# Patient Record
Sex: MALE | Race: AMERICAN INDIAN OR ALASKA NATIVE
[De-identification: names, ages, dates, MRNs, and addresses within clinical notes are randomized per-mention and may not be internally consistent; named-entity substitution may affect disease eponyms.]

---

## 2023-04-11 ENCOUNTER — HOSPITAL ENCOUNTER (INPATIENT)
Dept: HOSPITAL 26 - MED | Age: 58
LOS: 9 days | Discharge: HOME | DRG: 720 | End: 2023-04-20
Attending: INTERNAL MEDICINE | Admitting: INTERNAL MEDICINE
Payer: MEDICAID

## 2023-04-11 VITALS — BODY MASS INDEX: 18.96 KG/M2 | HEIGHT: 66 IN | WEIGHT: 118 LBS

## 2023-04-11 VITALS — DIASTOLIC BLOOD PRESSURE: 64 MMHG | SYSTOLIC BLOOD PRESSURE: 126 MMHG

## 2023-04-11 VITALS — DIASTOLIC BLOOD PRESSURE: 75 MMHG | SYSTOLIC BLOOD PRESSURE: 135 MMHG

## 2023-04-11 DIAGNOSIS — E11.22: ICD-10-CM

## 2023-04-11 DIAGNOSIS — B95.62: ICD-10-CM

## 2023-04-11 DIAGNOSIS — E44.0: ICD-10-CM

## 2023-04-11 DIAGNOSIS — Z20.822: ICD-10-CM

## 2023-04-11 DIAGNOSIS — E78.5: ICD-10-CM

## 2023-04-11 DIAGNOSIS — A41.9: Primary | ICD-10-CM

## 2023-04-11 DIAGNOSIS — M94.0: ICD-10-CM

## 2023-04-11 DIAGNOSIS — N17.9: ICD-10-CM

## 2023-04-11 DIAGNOSIS — E88.09: ICD-10-CM

## 2023-04-11 DIAGNOSIS — E11.65: ICD-10-CM

## 2023-04-11 DIAGNOSIS — I12.9: ICD-10-CM

## 2023-04-11 DIAGNOSIS — N39.0: ICD-10-CM

## 2023-04-11 DIAGNOSIS — E87.1: ICD-10-CM

## 2023-04-11 DIAGNOSIS — N18.9: ICD-10-CM

## 2023-04-11 LAB
ALBUMIN FLD-MCNC: 2.4 G/DL (ref 3.4–5)
ANION GAP SERPL CALCULATED.3IONS-SCNC: 13.3 MMOL/L (ref 8–16)
APPEARANCE UR: CLEAR
AST SERPL-CCNC: 41 U/L (ref 15–37)
BILIRUB SERPL-MCNC: 1.3 MG/DL (ref 0–1)
BILIRUB UR QL STRIP: NEGATIVE
BUN SERPL-MCNC: 26 MG/DL (ref 7–18)
CHLORIDE SERPL-SCNC: 93 MMOL/L (ref 98–107)
CO2 SERPL-SCNC: 28.2 MMOL/L (ref 21–32)
COLOR UR: YELLOW
CREAT SERPL-MCNC: 1.2 MG/DL (ref 0.6–1.3)
ERYTHROCYTE [DISTWIDTH] IN BLOOD BY AUTOMATED COUNT: 13.5 % (ref 11.6–13.7)
GFR SERPL CREATININE-BSD FRML MDRD: 80 ML/MIN (ref 90–?)
GLUCOSE SERPL-MCNC: 368 MG/DL (ref 74–106)
GLUCOSE UR STRIP-MCNC: (no result) MG/DL
HCT VFR BLD AUTO: 38.1 % (ref 36–52)
HGB BLD-MCNC: 12.9 G/DL (ref 12–18)
HGB UR QL STRIP: (no result)
LEUKOCYTE ESTERASE UR QL STRIP: NEGATIVE
LYMPHOCYTES NFR BLD MANUAL: 11 % (ref 20–46)
MCH RBC QN AUTO: 29 PG (ref 27–31)
MCHC RBC AUTO-ENTMCNC: 34 G/DL (ref 33–37)
MCV RBC AUTO: 85.2 FL (ref 80–94)
MONOCYTES NFR BLD MANUAL: 8 % (ref 5–12)
NITRITE UR QL STRIP: POSITIVE
PH UR STRIP: 6 [PH] (ref 5–9)
PLATELET # BLD AUTO: 183 K/UL (ref 140–450)
POTASSIUM SERPL-SCNC: 4.5 MMOL/L (ref 3.5–5.1)
PROMYELOCYTES NFR BLD MANUAL: 1 % (ref 0–0)
PROTHROMBIN TIME: 10.4 SECS (ref 10.8–13.4)
RBC # BLD AUTO: 4.47 MIL/UL (ref 4.2–6.1)
RBC #/AREA URNS HPF: (no result) /HPF (ref 0–5)
SODIUM SERPL-SCNC: 130 MMOL/L (ref 136–145)
WBC # BLD AUTO: 18.2 K/UL (ref 4.8–10.8)
WBC,URINE: (no result) /HPF (ref 0–5)

## 2023-04-11 RX ADMIN — ACETAMINOPHEN PRN MG: 325 TABLET ORAL at 22:34

## 2023-04-11 RX ADMIN — SODIUM CHLORIDE SCH MLS/HR: 9 INJECTION, SOLUTION INTRAVENOUS at 18:30

## 2023-04-11 NOTE — NUR
56 y/o M BIBA from home c/o left-sided chest pain, generalized body pain, 
dizziness, blurry vision, SOB, nausea, vomiting, abd pain x 3 days. Patient 
A&Ox4, ambulatory, reports with multiple medical complaints. Pt states 
left-sided chest pain 8/10, pressure/intermittent, radiating down left shoulder 
and left hip. Patient also reports pain across low abd; 7/10, 
cramping/constant, radiating to left rib cage and left shoulder. Patient with 
associated nausea and vomiting x 2 episodes today. Pt with constipation last 
BM: 5 days ago with bloody stools; reports new onset hemorrhoids with dark red 
blood. +Hematuria +Decreased appetite x 3 days; +urinary hesitancy x 1 month. 
Denies dysuria, fever, chills, headache, trauma, injuries, falls. Patient 
denies any sick household members. Cardiac monitor in place. SpO2 97% on room 
air tachypneic @ 25. Bed locked in lowest position, side rails x 1.



PMH: HTN, DM, HLD

Meds: lipitor, metformin, carvedilol

Allergies: insulin

## 2023-04-11 NOTE — NUR
PER PT'S REQUEST CHECK THE BS  .   - WILL RELAY TO DR. LOZOYA 

-------------------------------------------------------------------------------

Addendum: 04/12/23 at 0350 by Tosha Connolly RN

-------------------------------------------------------------------------------

AT 0000 DR. LOZOYA AWARE ABOUT BS HE SAID OK  VIA TEXT

## 2023-04-11 NOTE — NUR
Patient resting with both eyes closed in semi-fowlers position with cardiac 
monitor in place. Bed locked in lowest position, side rails x 1.

## 2023-04-11 NOTE — NUR
Patient resting in semi-fowlers position with maintenance IVF continued at 
80mL/hr on infusion pump. Cardiac monitor in place. Bed locked in lowest 
position, side rails x 2. 

Shalini (daughter) remains at bedside.

## 2023-04-11 NOTE — NUR
Patient reports + relief to pain; 7/10 at this time. Denies nausea. All pt 
needs met. Cardiac monitor in place.

## 2023-04-11 NOTE — NUR
Patient will be admitted to care of Dr. Grace. Admited to tele.  Will go to 
[id number]. Belongings list completed.  Report to Tosha BRADFORD.

## 2023-04-11 NOTE — NUR
PTS SISTER REQUESTING ACCUCHECK D/T PT BEING DIABETIC. DR LOZOYA PAGED TO NOTIFY 
HIM OF PTS ALLERGY TO INSULIN. PT AND SISTER UNAWARE OF WHICH INSULIN HE IS 
ALLERGIC TO. PT REPORTS THIS OCCURRED X1 MONTH AGO AT [facility] AND THEY 
"HAD TO REVIVE ME WITH GAS MASK". CURRENT . DR LOZOYA NOTIFIED. TORB ORDER 
FOR GLIMEPIRIDE 2MG TID, NO NEED FOR ACCUCHECKS- WILL CHECK GLUCOSE THROUGH 
MORNING LABS. ORDERS PLACED.

## 2023-04-11 NOTE — NUR
RECEIV[place] AAOX[place] , [place]  NOT IN [place]RESS  , WALKS TO BED  , IV SITES INTACT  
AND PATENT .  ON TELE MONITOR , RA O2 SAT WNL . FURTHER ADMISSION ASSESSMENT  TO BE DONE .  
C/O WEAKNESS  - WILL PUT ON FALL PREVENTION PROTOCOL , URINAL / CALL LIGHT WITHIN REACH . 
WILL CONT. TO MONITOR .

## 2023-04-12 VITALS — SYSTOLIC BLOOD PRESSURE: 128 MMHG | DIASTOLIC BLOOD PRESSURE: 77 MMHG

## 2023-04-12 VITALS — SYSTOLIC BLOOD PRESSURE: 127 MMHG | DIASTOLIC BLOOD PRESSURE: 78 MMHG

## 2023-04-12 VITALS — SYSTOLIC BLOOD PRESSURE: 125 MMHG | DIASTOLIC BLOOD PRESSURE: 77 MMHG

## 2023-04-12 VITALS — DIASTOLIC BLOOD PRESSURE: 70 MMHG | SYSTOLIC BLOOD PRESSURE: 120 MMHG

## 2023-04-12 VITALS — DIASTOLIC BLOOD PRESSURE: 59 MMHG | SYSTOLIC BLOOD PRESSURE: 122 MMHG

## 2023-04-12 LAB
ALBUMIN FLD-MCNC: 1.9 G/DL (ref 3.4–5)
ANION GAP SERPL CALCULATED.3IONS-SCNC: 12.7 MMOL/L (ref 8–16)
AST SERPL-CCNC: 38 U/L (ref 15–37)
BILIRUB SERPL-MCNC: 0.9 MG/DL (ref 0–1)
BUN SERPL-MCNC: 27 MG/DL (ref 7–18)
CHLORIDE SERPL-SCNC: 98 MMOL/L (ref 98–107)
CO2 SERPL-SCNC: 24.9 MMOL/L (ref 21–32)
CREAT SERPL-MCNC: 1.3 MG/DL (ref 0.6–1.3)
GFR SERPL CREATININE-BSD FRML MDRD: 73 ML/MIN (ref 90–?)
GLUCOSE SERPL-MCNC: 444 MG/DL (ref 74–106)
POTASSIUM SERPL-SCNC: 4.6 MMOL/L (ref 3.5–5.1)
SODIUM SERPL-SCNC: 131 MMOL/L (ref 136–145)

## 2023-04-12 RX ADMIN — Medication SCH DEV: at 21:19

## 2023-04-12 RX ADMIN — ACETAMINOPHEN PRN MG: 325 TABLET ORAL at 18:03

## 2023-04-12 RX ADMIN — DOCUSATE SODIUM SCH MG: 100 CAPSULE, LIQUID FILLED ORAL at 08:43

## 2023-04-12 RX ADMIN — GLIMEPIRIDE SCH MG: 2 TABLET ORAL at 12:24

## 2023-04-12 RX ADMIN — GLIMEPIRIDE SCH MG: 2 TABLET ORAL at 17:33

## 2023-04-12 RX ADMIN — Medication SCH DEV: at 12:24

## 2023-04-12 RX ADMIN — HYDROCODONE BITARTRATE AND ACETAMINOPHEN PRN TAB: 5; 325 TABLET ORAL at 18:33

## 2023-04-12 RX ADMIN — SODIUM CHLORIDE SCH MLS/HR: 9 INJECTION, SOLUTION INTRAVENOUS at 02:34

## 2023-04-12 RX ADMIN — Medication SCH DEV: at 16:30

## 2023-04-12 RX ADMIN — GLIMEPIRIDE SCH MG: 2 TABLET ORAL at 08:43

## 2023-04-12 RX ADMIN — SODIUM CHLORIDE SCH MLS/HR: 9 INJECTION, SOLUTION INTRAVENOUS at 07:49

## 2023-04-12 NOTE — NUR
RECEIVED REPORT FROM NIGHT SHIFT. PT AWAKE IN BED, HOB ELEVATED. AOX4, ABLE TO VERBALIZE 
NEEDS. WITH C/O MUSCLE PAIN ON ALL EXTREMITIES AND CHEST WHEN MOVING, SOB ONLY ON EXERTION. 
WITH PERIPHERAL IV RAC 20G AND LAC 20G RUNNING NS AT 80CC/HR. PLAN OF CARE DISCUSSED.

## 2023-04-12 NOTE — NUR
DC PLANNING 



*** ASSESSMENT COMPLETE***



PLEASE REFER TO ASSESSMENT FOR ADDITIONAL DETAILS 



PT REPORTS TENTATIVE DC PLAN TO RETURN HOME HOWEVER, PT INQUIRED ON SNF 

PLACEMENT. SW SPOKE TO PATIENT ON HOW PLACEMENT IS TYPICALLY ARRANGED AND 

THE COMPLEXITIES OF SNF CARE. PT VERBALIZED UNDERSTANDING AND REPORTED HE 

WOULD SPEAK TO ATTENDING ON INQUIRING ABOUT SNF PLACEMENT. 



PT ACCEPTED [facility] OFFERED BY [facility]. 

SPOKE TO PT ABOUT WALK UP SHELTERS IN THE [place] AREA, HOWEVER, PT 

DECLINED AND REPORTED HE WAS NOT INTERESTED. PT REPORTS HE WOULD SPEAK TO 

HIS MOTHER ABOUT STAYING WITH HER UNTIL HE FELT WELL ENOUGH IF SNF PLACEMENT COULD NOT BE 
ARRANGED OR IDENTIFIED. 


-------------------------------------------------------------------------------

Addendum: 04/13/23 at 0822 by Bee MOTA

-------------------------------------------------------------------------------

Amended: Links added.

## 2023-04-12 NOTE — NUR
endorsed pt for cont. of care , i endorsed to nurse dilia  , rn  i relayed to dr. israel about 
latest bs of 444  he have to ff up  if there is further order from  dr. israel  , dilia , rn 
verbalized understanding . pt is  sleeping but easily awakable by touch  , call light / 
urinal within reach .

## 2023-04-12 NOTE — NUR
PT WITH EPISODE OF SEVERE CHEST PAIN WITH SOB  THAT RADIATED FROM LEFT SIDE OF ABD. PAIN 
STARTED TO SUBSIDE AFTER A FEW MINUTES. NORCO GIVEN FOR PAIN

## 2023-04-12 NOTE — NUR
DC PLANNIN YRS OLD MALE PATIENT WAS ADMITTED FROM HOME WITH A DX OF UTI, CHEST PAIN. PATIENT HAS A 
HX OF DM, HTN AND HLD . CXR SHOWED MILD STREAKY LEFT BASAL ATELECTASIS .CT ABD SHOWED 
HEPATIC CIRRHOSIS AND SEQUELA OF PORTAL HYPERTENSION. CT CHEST NO EVIDENCED OF PE. RAPID 
COVID TEST NEGATIVE. CONSULTED WITH NEPHRO. DC PLAN TO GO HOME WHEN STABLE CM TO FOLLOW 

-------------------------------------------------------------------------------

Addendum: 23 at 1213 by Louann Rajan RN

-------------------------------------------------------------------------------

DC PLANNING:

SEEN BY NEPHROLOGIST CONTINUED IV ABX VANCOMYCIN DC PLAN TO GO TO SNF FOR IV ABX  VANCOMYCIN 
FOR SIX WEEKS. PATIENT IS HOMELESS . TO FOLLOW UP FOR HOMELESSNESS. CM TO 
FOLLOW 

-------------------------------------------------------------------------------

Addendum: 23 at 1454 by Louann Rajan RN

-------------------------------------------------------------------------------

DC PLANNING:

CM SPOKE WITH PATIENT NOTIFIED HIM THAT UNABLE TO GET SNF AND PATIENT STATED WILL ASK HIS 
MOTHER IF SHE ALLOWS HIM TO STAY WITH HER UNTIL HE FINISHED THE IV ABX. PATIENT VERBALIZED 
UNDERSTANDING WILL DO MORE TO TAKE CARE OF HIMSELF, WILL START LOOKING FOR ALCOHOL AND DRUG 
REHAB PROGRAM. PATIENT STATED HE RECEIVED ALL THE RESOURCES AND WILL WORKING ON IT. DC PLAN 
TO GO HOME WITH HOME HEALTH FOR IV. CM TO FOLLOW 

-------------------------------------------------------------------------------

Addendum: 23 at 1351 by Louann Rajan RN

-------------------------------------------------------------------------------

DC PLANNING:

CM SPOKE WITH DR LONG REGARDING DC PLAN PE MD AWAITING FOR BC RESULT AND ANTIBIOTICS 
FROM ID. DC PLAN TO GO TO PT'S MOM'S HOUSE WITH HOME HEALTH FOR IV ABX. CM TO FOLLOW 

-------------------------------------------------------------------------------

Addendum: 23 at 2350 by Louann Rajan RN

-------------------------------------------------------------------------------

DC PLANNING:

ARRANGED HOME INFUSION VANCOMYCIN 19G Q 18 HRS WITH [facility]. FAXED THE ORDER LABS FOR 
VANCO TROUGH, CBC AND BMP EVERY WEEK. PER [facility] WILL ARRANGE  HOME HEALTH  FOR PICC LINE 
CARE AS WELL. MEDICATION WILL BE DELIVERED TODAY AND PATIENT CAN BE DISCHARGE NOTIFIED 
LUIZA CHARGE NURSE. CM TO FOLLOW

## 2023-04-12 NOTE — NUR
TEMP RE CHECK 100.1 F - WILL ORAL  PROVIDE FLUID ,  TSB REFUSED BY PT .IVF INFUSING WELL . 
WILL CONT. TO MONITOR  , CALL LIGHT WITHIN REACH .

## 2023-04-12 NOTE — NUR
PATIENT HAS BEEN SCREENED AND CATEGORIZED AS HIGH NUTRITION RISK. PATIENT WILL BE SEEN 
WITHIN 1-2 DAYS OF ADMISSION.



4/12/23-4/13/23



REVIEWED BY OLI BLUE RD

## 2023-04-12 NOTE — NUR
4/12/23 RD INITIAL ASSESSMENT COMPLETED



PLEASE REFER TO NUTRITION ASSESSMENT UNDER CARE ACTIVITY FOR ESTIMATED NUTRITIONAL NEEDS. 



1. RECOMMEND ADDING [id number] TO CARDIAC DIET AND DOWNGRADING TO MECHANICAL SOFT TEXTURE 

2. RECOMMEND ADDING GLUCERNA BID

-PROVIDING 440KCAL, 20G PROTEIN

3. PROVIDED NUTRITION EDUCATION AND HANDOUTS FOR DM

4. MONITOR GI, PO INTAKE, AND LAB VALUES.

5. RD TO FOLLOW-UP 3-5 DAYS, MODERATE RISK 



REVIEWED BY OLI BLUE RD

## 2023-04-13 VITALS — SYSTOLIC BLOOD PRESSURE: 115 MMHG | DIASTOLIC BLOOD PRESSURE: 65 MMHG

## 2023-04-13 VITALS — DIASTOLIC BLOOD PRESSURE: 80 MMHG | SYSTOLIC BLOOD PRESSURE: 120 MMHG

## 2023-04-13 VITALS — SYSTOLIC BLOOD PRESSURE: 126 MMHG | DIASTOLIC BLOOD PRESSURE: 78 MMHG

## 2023-04-13 VITALS — SYSTOLIC BLOOD PRESSURE: 117 MMHG | DIASTOLIC BLOOD PRESSURE: 65 MMHG

## 2023-04-13 VITALS — SYSTOLIC BLOOD PRESSURE: 112 MMHG | DIASTOLIC BLOOD PRESSURE: 78 MMHG

## 2023-04-13 VITALS — SYSTOLIC BLOOD PRESSURE: 122 MMHG | DIASTOLIC BLOOD PRESSURE: 80 MMHG

## 2023-04-13 LAB
ALBUMIN FLD-MCNC: 1.6 G/DL (ref 3.4–5)
ANION GAP SERPL CALCULATED.3IONS-SCNC: 10.6 MMOL/L (ref 8–16)
AST SERPL-CCNC: 50 U/L (ref 15–37)
BILIRUB SERPL-MCNC: 0.7 MG/DL (ref 0–1)
BUN SERPL-MCNC: 27 MG/DL (ref 7–18)
CHLORIDE SERPL-SCNC: 100 MMOL/L (ref 98–107)
CO2 SERPL-SCNC: 25.7 MMOL/L (ref 21–32)
CREAT SERPL-MCNC: 0.9 MG/DL (ref 0.6–1.3)
GFR SERPL CREATININE-BSD FRML MDRD: 111 ML/MIN (ref 90–?)
GLUCOSE SERPL-MCNC: 245 MG/DL (ref 74–106)
POTASSIUM SERPL-SCNC: 4.3 MMOL/L (ref 3.5–5.1)
SODIUM SERPL-SCNC: 132 MMOL/L (ref 136–145)

## 2023-04-13 RX ADMIN — GLIMEPIRIDE SCH MG: 2 TABLET ORAL at 14:37

## 2023-04-13 RX ADMIN — Medication SCH DEV: at 16:30

## 2023-04-13 RX ADMIN — GLIMEPIRIDE SCH MG: 2 TABLET ORAL at 17:48

## 2023-04-13 RX ADMIN — DOCUSATE SODIUM SCH MG: 100 CAPSULE, LIQUID FILLED ORAL at 08:53

## 2023-04-13 RX ADMIN — SODIUM CHLORIDE SCH MLS/HR: 9 INJECTION, SOLUTION INTRAVENOUS at 07:15

## 2023-04-13 RX ADMIN — Medication SCH DEV: at 11:30

## 2023-04-13 RX ADMIN — ACETAMINOPHEN PRN MG: 325 TABLET ORAL at 02:10

## 2023-04-13 RX ADMIN — GLIMEPIRIDE SCH MG: 2 TABLET ORAL at 08:53

## 2023-04-13 RX ADMIN — SODIUM CHLORIDE PRN MG: 9 INJECTION, SOLUTION INTRAVENOUS at 20:41

## 2023-04-13 RX ADMIN — Medication SCH DEV: at 20:32

## 2023-04-13 RX ADMIN — HYDROCODONE BITARTRATE AND ACETAMINOPHEN PRN TAB: 5; 325 TABLET ORAL at 17:47

## 2023-04-13 RX ADMIN — HYDROCODONE BITARTRATE AND ACETAMINOPHEN PRN TAB: 5; 325 TABLET ORAL at 08:53

## 2023-04-13 RX ADMIN — SODIUM CHLORIDE SCH MLS/HR: 9 INJECTION, SOLUTION INTRAVENOUS at 19:45

## 2023-04-13 RX ADMIN — Medication SCH DEV: at 07:19

## 2023-04-13 NOTE — NUR
RECEIVED REPORT FROM DAY SHIFT RN FOR CONTINUITY OF CARE. PT IS AWAKE AND ALERT. FAMILY BY 
BEDSIDE. PT NOT IN ANY DISTRESS. ON RA SATING 98%. POC DISCUSSED. WILL CONTINUE TO MONITOR 
THE PT. 

-------------------------------------------------------------------------------

Addendum: 04/13/23 at 2101 by Curly Murillo RN

-------------------------------------------------------------------------------

REPORT RECEIVED 1915

## 2023-04-14 VITALS — DIASTOLIC BLOOD PRESSURE: 71 MMHG | SYSTOLIC BLOOD PRESSURE: 110 MMHG

## 2023-04-14 VITALS — SYSTOLIC BLOOD PRESSURE: 100 MMHG | DIASTOLIC BLOOD PRESSURE: 67 MMHG

## 2023-04-14 VITALS — SYSTOLIC BLOOD PRESSURE: 113 MMHG | DIASTOLIC BLOOD PRESSURE: 73 MMHG

## 2023-04-14 VITALS — DIASTOLIC BLOOD PRESSURE: 73 MMHG | SYSTOLIC BLOOD PRESSURE: 110 MMHG

## 2023-04-14 VITALS — DIASTOLIC BLOOD PRESSURE: 66 MMHG | SYSTOLIC BLOOD PRESSURE: 101 MMHG

## 2023-04-14 VITALS — SYSTOLIC BLOOD PRESSURE: 109 MMHG | DIASTOLIC BLOOD PRESSURE: 72 MMHG

## 2023-04-14 LAB
ALBUMIN FLD-MCNC: 1.5 G/DL (ref 3.4–5)
ANION GAP SERPL CALCULATED.3IONS-SCNC: 14.9 MMOL/L (ref 8–16)
AST SERPL-CCNC: 45 U/L (ref 15–37)
BILIRUB SERPL-MCNC: 0.7 MG/DL (ref 0–1)
BUN SERPL-MCNC: 36 MG/DL (ref 7–18)
CHLORIDE SERPL-SCNC: 97 MMOL/L (ref 98–107)
CO2 SERPL-SCNC: 22.3 MMOL/L (ref 21–32)
CREAT SERPL-MCNC: 1.1 MG/DL (ref 0.6–1.3)
GFR SERPL CREATININE-BSD FRML MDRD: 88 ML/MIN (ref 90–?)
GLUCOSE SERPL-MCNC: 460 MG/DL (ref 74–106)
POTASSIUM SERPL-SCNC: 5.2 MMOL/L (ref 3.5–5.1)
SODIUM SERPL-SCNC: 129 MMOL/L (ref 136–145)

## 2023-04-14 RX ADMIN — GLIMEPIRIDE SCH MG: 2 TABLET ORAL at 13:00

## 2023-04-14 RX ADMIN — Medication SCH DEV: at 21:34

## 2023-04-14 RX ADMIN — SODIUM CHLORIDE PRN MG: 9 INJECTION, SOLUTION INTRAVENOUS at 03:41

## 2023-04-14 RX ADMIN — INSULIN LISPRO PRN UNITS: 100 INJECTION, SOLUTION INTRAVENOUS; SUBCUTANEOUS at 21:29

## 2023-04-14 RX ADMIN — DOCUSATE SODIUM SCH MG: 100 CAPSULE, LIQUID FILLED ORAL at 09:00

## 2023-04-14 RX ADMIN — Medication SCH DEV: at 11:30

## 2023-04-14 RX ADMIN — GLIMEPIRIDE SCH MG: 2 TABLET ORAL at 10:13

## 2023-04-14 RX ADMIN — Medication SCH DEV: at 16:30

## 2023-04-14 RX ADMIN — GLIMEPIRIDE SCH MG: 2 TABLET ORAL at 17:00

## 2023-04-14 RX ADMIN — Medication SCH DEV: at 07:02

## 2023-04-14 RX ADMIN — SODIUM CHLORIDE PRN MG: 9 INJECTION, SOLUTION INTRAVENOUS at 19:53

## 2023-04-14 RX ADMIN — SODIUM CHLORIDE SCH MLS/HR: 9 INJECTION, SOLUTION INTRAVENOUS at 01:31

## 2023-04-14 NOTE — NUR
CHECKED ON PT. PT IS SLEEPING IN BED NOT IN ANY DISTRESS. BREATHING EVEN AND UNLABORED. 
SAFETY MEASURES TAKEN. WILL CONTINUE TO MONITOR THE PT.

## 2023-04-14 NOTE — NUR
****P.T. NOTES****

JOSE P.TJarek COBB AT THIS TIME, Pt VERY ANXIOUS, EASILY AGITATED, EMOTIONAL, USES FOUL WORDS, 
WAITING FOR

"2 MALE STAFF" TO CLEAN HIM UP, NURSE NOTIFIED.

-------------------------------------------------------------------------------

Addendum: 04/14/23 at 1919 by Shanthi Abreu PT

-------------------------------------------------------------------------------

[id number]

## 2023-04-14 NOTE — NUR
0800 RECIEVED PT FROM DANNY BRADFORD. PT RESTING IN BED EYES CLOSED. NO GAURDING OR GRIMACING. NO 
ACUTE DISTRESS NOTED AT THIS TIME. [id number].

## 2023-04-14 NOTE — NUR
1945: REPORT OFF TO DANNY BRADFORD. PT RESTING IN BED FAMILY AT BEDSIDE. NO ACUTE DISTRESS NOTED 
AT THIS TIME. MNURMV2.

## 2023-04-14 NOTE — NUR
RECEIVED REPORT FROM DAY SHIFT RN FOR CONTINUITY OF CARE. PT IS AWAKE AND ALERT. PT NOT IN 
ANY DISTRESS. ON RA SATING 98%. POC DISCUSSED. WILL CONTINUE TO MONITOR THE PT.

## 2023-04-15 VITALS — SYSTOLIC BLOOD PRESSURE: 110 MMHG | DIASTOLIC BLOOD PRESSURE: 64 MMHG

## 2023-04-15 VITALS — SYSTOLIC BLOOD PRESSURE: 97 MMHG | DIASTOLIC BLOOD PRESSURE: 60 MMHG

## 2023-04-15 VITALS — SYSTOLIC BLOOD PRESSURE: 105 MMHG | DIASTOLIC BLOOD PRESSURE: 65 MMHG

## 2023-04-15 LAB
ALBUMIN FLD-MCNC: 1.5 G/DL (ref 3.4–5)
ANION GAP SERPL CALCULATED.3IONS-SCNC: 13.3 MMOL/L (ref 8–16)
AST SERPL-CCNC: 80 U/L (ref 15–37)
BILIRUB SERPL-MCNC: 0.4 MG/DL (ref 0–1)
BUN SERPL-MCNC: 45 MG/DL (ref 7–18)
CHLORIDE SERPL-SCNC: 97 MMOL/L (ref 98–107)
CO2 SERPL-SCNC: 22.7 MMOL/L (ref 21–32)
CREAT SERPL-MCNC: 1.2 MG/DL (ref 0.6–1.3)
GFR SERPL CREATININE-BSD FRML MDRD: 80 ML/MIN (ref 90–?)
GLUCOSE SERPL-MCNC: 534 MG/DL (ref 74–106)
POTASSIUM SERPL-SCNC: 5 MMOL/L (ref 3.5–5.1)
SODIUM SERPL-SCNC: 128 MMOL/L (ref 136–145)

## 2023-04-15 RX ADMIN — SODIUM CHLORIDE PRN MG: 9 INJECTION, SOLUTION INTRAVENOUS at 10:55

## 2023-04-15 RX ADMIN — INSULIN LISPRO PRN UNITS: 100 INJECTION, SOLUTION INTRAVENOUS; SUBCUTANEOUS at 06:53

## 2023-04-15 RX ADMIN — GLIMEPIRIDE SCH MG: 2 TABLET ORAL at 19:10

## 2023-04-15 RX ADMIN — GLIMEPIRIDE SCH MG: 2 TABLET ORAL at 09:58

## 2023-04-15 RX ADMIN — GLIMEPIRIDE SCH MG: 2 TABLET ORAL at 13:42

## 2023-04-15 RX ADMIN — SODIUM CHLORIDE PRN MG: 9 INJECTION, SOLUTION INTRAVENOUS at 01:12

## 2023-04-15 RX ADMIN — GABAPENTIN SCH MG: 300 CAPSULE ORAL at 19:10

## 2023-04-15 RX ADMIN — SODIUM CHLORIDE PRN MG: 9 INJECTION, SOLUTION INTRAVENOUS at 19:59

## 2023-04-15 RX ADMIN — DEXTROSE SCH MLS/HR: 5 SOLUTION INTRAVENOUS at 15:29

## 2023-04-15 RX ADMIN — Medication SCH DEV: at 19:10

## 2023-04-15 RX ADMIN — GABAPENTIN SCH MG: 300 CAPSULE ORAL at 15:34

## 2023-04-15 RX ADMIN — Medication SCH DEV: at 22:00

## 2023-04-15 RX ADMIN — Medication SCH DEV: at 13:32

## 2023-04-15 RX ADMIN — Medication SCH DEV: at 06:54

## 2023-04-15 RX ADMIN — DOCUSATE SODIUM SCH MG: 100 CAPSULE, LIQUID FILLED ORAL at 09:58

## 2023-04-15 RX ADMIN — HYDROCODONE BITARTRATE AND ACETAMINOPHEN PRN TAB: 5; 325 TABLET ORAL at 10:16

## 2023-04-15 NOTE — NUR
RECEIVED REPORT FROM DAY SHIFT RN FOR CONTINUITY OF CARE. PT IS AAOX4 ON RA. SATING 93%. PT 
NOT IN AN DISTRESS. POC DISCUSSED. WILL CONTINUE TO MONITOR THE PT.

## 2023-04-16 VITALS — DIASTOLIC BLOOD PRESSURE: 66 MMHG | SYSTOLIC BLOOD PRESSURE: 110 MMHG

## 2023-04-16 VITALS — SYSTOLIC BLOOD PRESSURE: 106 MMHG | DIASTOLIC BLOOD PRESSURE: 68 MMHG

## 2023-04-16 VITALS — DIASTOLIC BLOOD PRESSURE: 64 MMHG | SYSTOLIC BLOOD PRESSURE: 107 MMHG

## 2023-04-16 VITALS — DIASTOLIC BLOOD PRESSURE: 81 MMHG | SYSTOLIC BLOOD PRESSURE: 101 MMHG

## 2023-04-16 LAB
ALBUMIN FLD-MCNC: 1.6 G/DL (ref 3.4–5)
ANION GAP SERPL CALCULATED.3IONS-SCNC: 9.1 MMOL/L (ref 8–16)
AST SERPL-CCNC: 102 U/L (ref 15–37)
BILIRUB SERPL-MCNC: 0.5 MG/DL (ref 0–1)
BUN SERPL-MCNC: 41 MG/DL (ref 7–18)
CHLORIDE SERPL-SCNC: 104 MMOL/L (ref 98–107)
CO2 SERPL-SCNC: 26.9 MMOL/L (ref 21–32)
CREAT SERPL-MCNC: 0.9 MG/DL (ref 0.6–1.3)
GFR SERPL CREATININE-BSD FRML MDRD: 111 ML/MIN (ref 90–?)
GLUCOSE SERPL-MCNC: 146 MG/DL (ref 74–106)
POTASSIUM SERPL-SCNC: 5 MMOL/L (ref 3.5–5.1)
SODIUM SERPL-SCNC: 135 MMOL/L (ref 136–145)

## 2023-04-16 RX ADMIN — GLIMEPIRIDE SCH MG: 2 TABLET ORAL at 18:57

## 2023-04-16 RX ADMIN — SODIUM CHLORIDE PRN MG: 9 INJECTION, SOLUTION INTRAVENOUS at 11:58

## 2023-04-16 RX ADMIN — GABAPENTIN SCH MG: 300 CAPSULE ORAL at 14:26

## 2023-04-16 RX ADMIN — GABAPENTIN SCH MG: 300 CAPSULE ORAL at 18:57

## 2023-04-16 RX ADMIN — DOCUSATE SODIUM SCH MG: 100 CAPSULE, LIQUID FILLED ORAL at 09:57

## 2023-04-16 RX ADMIN — Medication SCH DEV: at 16:29

## 2023-04-16 RX ADMIN — DEXTROSE SCH MLS/HR: 5 SOLUTION INTRAVENOUS at 14:24

## 2023-04-16 RX ADMIN — SODIUM CHLORIDE PRN MG: 9 INJECTION, SOLUTION INTRAVENOUS at 02:33

## 2023-04-16 RX ADMIN — GLIMEPIRIDE SCH MG: 2 TABLET ORAL at 09:57

## 2023-04-16 RX ADMIN — INSULIN LISPRO PRN UNITS: 100 INJECTION, SOLUTION INTRAVENOUS; SUBCUTANEOUS at 11:50

## 2023-04-16 RX ADMIN — GLIMEPIRIDE SCH MG: 2 TABLET ORAL at 14:26

## 2023-04-16 RX ADMIN — INSULIN LISPRO PRN UNITS: 100 INJECTION, SOLUTION INTRAVENOUS; SUBCUTANEOUS at 16:32

## 2023-04-16 RX ADMIN — GABAPENTIN SCH MG: 300 CAPSULE ORAL at 09:57

## 2023-04-16 RX ADMIN — Medication SCH DEV: at 11:47

## 2023-04-16 RX ADMIN — INSULIN LISPRO PRN UNITS: 100 INJECTION, SOLUTION INTRAVENOUS; SUBCUTANEOUS at 22:20

## 2023-04-16 RX ADMIN — Medication SCH DEV: at 21:00

## 2023-04-16 RX ADMIN — ACETAMINOPHEN PRN MG: 325 TABLET ORAL at 10:02

## 2023-04-16 RX ADMIN — HYDROCODONE BITARTRATE AND ACETAMINOPHEN PRN TAB: 5; 325 TABLET ORAL at 22:53

## 2023-04-16 RX ADMIN — Medication SCH DEV: at 06:37

## 2023-04-16 RX ADMIN — SODIUM CHLORIDE PRN MG: 9 INJECTION, SOLUTION INTRAVENOUS at 00:13

## 2023-04-16 NOTE — NUR
VITAL SIGNS TAKEN AND STABLE. PT HAD ASKED FOR PAIN MEDICATION AND THEN ENDED UP REFUSING. 
NO OTHER COMPLAINS. WILL CONTINUE TO MONITOR THE PT.

## 2023-04-16 NOTE — NUR
PT IS AWAKE AND ALERT, ABLE TO VERBALIZE NEEDS. PT HAS PICC LINE ON RIGHT ARM - 2 LUMENS, 
INTACT AND PATENT. HE IS ON SALINE LOCK. AND IS ON CARDIAC DIET. CONSUME 40% OF DINNER.

## 2023-04-16 NOTE — NUR
4/16/23 RD FOLLOW UP COMPLETED.PLEASE REFER TO NUTRITION ASSESSMENT UNDER CARE ACTIVITY FOR 
ESTIMATED NUTRITIONAL NEEDS. 

1. CONTINUE WITH [id number]/CARDIAC DIET, MECHANICAL SOFT TEXTURES AS PT TOLERATES. 

2. CONTINUE WITH GLUCERNA BID

-PROVIDING 440KCAL, 10G PROTEIN

3. MONITOR PO INTAKE

4. RD TO FOLLOW-UP 3-5 DAYS, MODERATE RISK 



SULTANA MASSEY, RD

## 2023-04-16 NOTE — NUR
PT COMPLAINTS OF PAIN ON HIP, BACK AND FEET 6/10, PAIN MEDICATION NORCO ADMINISTERED AS 
ORDER. PT IS AWAKE AND ALERT, VERBALLY RESPONSIVE.

## 2023-04-17 VITALS — DIASTOLIC BLOOD PRESSURE: 75 MMHG | SYSTOLIC BLOOD PRESSURE: 114 MMHG

## 2023-04-17 VITALS — SYSTOLIC BLOOD PRESSURE: 114 MMHG | DIASTOLIC BLOOD PRESSURE: 75 MMHG

## 2023-04-17 VITALS — DIASTOLIC BLOOD PRESSURE: 70 MMHG | SYSTOLIC BLOOD PRESSURE: 114 MMHG

## 2023-04-17 LAB
ANION GAP SERPL CALCULATED.3IONS-SCNC: 11.5 MMOL/L (ref 8–16)
ANION GAP SERPL CALCULATED.3IONS-SCNC: 8.9 MMOL/L (ref 8–16)
BASOPHILS # BLD AUTO: 0.1 K/UL (ref 0–0.22)
BASOPHILS NFR BLD AUTO: 0.9 % (ref 0–2)
BUN SERPL-MCNC: 36 MG/DL (ref 7–18)
BUN SERPL-MCNC: 38 MG/DL (ref 7–18)
CHLORIDE SERPL-SCNC: 103 MMOL/L (ref 98–107)
CHLORIDE SERPL-SCNC: 103 MMOL/L (ref 98–107)
CO2 SERPL-SCNC: 25.2 MMOL/L (ref 21–32)
CO2 SERPL-SCNC: 26.9 MMOL/L (ref 21–32)
CREAT SERPL-MCNC: 0.9 MG/DL (ref 0.6–1.3)
CREAT SERPL-MCNC: 0.9 MG/DL (ref 0.6–1.3)
EOSINOPHIL # BLD AUTO: 0.1 K/UL (ref 0–0.4)
EOSINOPHIL NFR BLD AUTO: 0.9 % (ref 0–4)
ERYTHROCYTE [DISTWIDTH] IN BLOOD BY AUTOMATED COUNT: 14.1 % (ref 11.6–13.7)
GFR SERPL CREATININE-BSD FRML MDRD: 111 ML/MIN (ref 90–?)
GFR SERPL CREATININE-BSD FRML MDRD: 111 ML/MIN (ref 90–?)
GLUCOSE SERPL-MCNC: 335 MG/DL (ref 74–106)
GLUCOSE SERPL-MCNC: 364 MG/DL (ref 74–106)
HCT VFR BLD AUTO: 33 % (ref 36–52)
HGB BLD-MCNC: 11.2 G/DL (ref 12–18)
LYMPHOCYTES # BLD AUTO: 0.9 K/UL (ref 2–11.5)
LYMPHOCYTES NFR BLD AUTO: 9.6 % (ref 20.5–51.1)
MCH RBC QN AUTO: 29 PG (ref 27–31)
MCHC RBC AUTO-ENTMCNC: 34 G/DL (ref 33–37)
MCV RBC AUTO: 85.8 FL (ref 80–94)
MONOCYTES # BLD AUTO: 0.6 K/UL (ref 0.8–1)
MONOCYTES NFR BLD AUTO: 6.3 % (ref 1.7–9.3)
NEUTROPHILS # BLD AUTO: 7.8 K/UL (ref 1.8–7.7)
NEUTROPHILS NFR BLD AUTO: 82.3 % (ref 42.2–75.2)
PLATELET # BLD AUTO: 176 K/UL (ref 140–450)
POTASSIUM SERPL-SCNC: 4.7 MMOL/L (ref 3.5–5.1)
POTASSIUM SERPL-SCNC: 4.8 MMOL/L (ref 3.5–5.1)
RBC # BLD AUTO: 3.85 MIL/UL (ref 4.2–6.1)
SODIUM SERPL-SCNC: 134 MMOL/L (ref 136–145)
SODIUM SERPL-SCNC: 135 MMOL/L (ref 136–145)
WBC # BLD AUTO: 9.5 K/UL (ref 4.8–10.8)

## 2023-04-17 RX ADMIN — GABAPENTIN SCH MG: 300 CAPSULE ORAL at 13:30

## 2023-04-17 RX ADMIN — GABAPENTIN SCH MG: 300 CAPSULE ORAL at 17:36

## 2023-04-17 RX ADMIN — HYDROCODONE BITARTRATE AND ACETAMINOPHEN PRN TAB: 5; 325 TABLET ORAL at 09:28

## 2023-04-17 RX ADMIN — INSULIN LISPRO PRN UNITS: 100 INJECTION, SOLUTION INTRAVENOUS; SUBCUTANEOUS at 22:19

## 2023-04-17 RX ADMIN — HYDROCODONE BITARTRATE AND ACETAMINOPHEN PRN TAB: 5; 325 TABLET ORAL at 15:20

## 2023-04-17 RX ADMIN — GLIMEPIRIDE SCH MG: 2 TABLET ORAL at 13:30

## 2023-04-17 RX ADMIN — INSULIN LISPRO PRN UNITS: 100 INJECTION, SOLUTION INTRAVENOUS; SUBCUTANEOUS at 16:52

## 2023-04-17 RX ADMIN — Medication SCH DEV: at 22:00

## 2023-04-17 RX ADMIN — INSULIN GLARGINE SCH UNITS: 100 INJECTION, SOLUTION SUBCUTANEOUS at 22:00

## 2023-04-17 RX ADMIN — INSULIN LISPRO PRN UNITS: 100 INJECTION, SOLUTION INTRAVENOUS; SUBCUTANEOUS at 12:13

## 2023-04-17 RX ADMIN — GLIMEPIRIDE SCH MG: 2 TABLET ORAL at 17:35

## 2023-04-17 RX ADMIN — GLIMEPIRIDE SCH MG: 2 TABLET ORAL at 09:29

## 2023-04-17 RX ADMIN — Medication SCH DEV: at 06:56

## 2023-04-17 RX ADMIN — DOCUSATE SODIUM SCH MG: 100 CAPSULE, LIQUID FILLED ORAL at 09:29

## 2023-04-17 RX ADMIN — Medication SCH DEV: at 12:11

## 2023-04-17 RX ADMIN — INSULIN LISPRO PRN UNITS: 100 INJECTION, SOLUTION INTRAVENOUS; SUBCUTANEOUS at 06:57

## 2023-04-17 RX ADMIN — DEXTROSE SCH MLS/HR: 5 SOLUTION INTRAVENOUS at 15:16

## 2023-04-17 RX ADMIN — Medication SCH DEV: at 16:50

## 2023-04-17 RX ADMIN — GABAPENTIN SCH MG: 300 CAPSULE ORAL at 09:29

## 2023-04-17 NOTE — NUR
PT CURRENTLY ON HEPARIN. NO RECENT LABS. DR. LONG NOTIFIED WITH NEW ORDERS: CBC, BMP 
TODAY. ORDER NOTED AND CARRIED OUT.

## 2023-04-17 NOTE — NUR
RECEIVED REPORT FROM LES NURSE ADONIS FOR CONTINUITY OF CARE. INITIAL ASSESSMENT DONE. IV 
ON SALINE LOCK. PICC LINE TO TAY INTACT. NOT IN ANY DISTRESS NOTED. CALL LIGHT KEPT WITHIN 
REACH. WILL CONTINUE TO MONITOR.

## 2023-04-18 VITALS — SYSTOLIC BLOOD PRESSURE: 136 MMHG | DIASTOLIC BLOOD PRESSURE: 85 MMHG

## 2023-04-18 VITALS — DIASTOLIC BLOOD PRESSURE: 65 MMHG | SYSTOLIC BLOOD PRESSURE: 101 MMHG

## 2023-04-18 VITALS — DIASTOLIC BLOOD PRESSURE: 68 MMHG | SYSTOLIC BLOOD PRESSURE: 103 MMHG

## 2023-04-18 LAB
ANION GAP SERPL CALCULATED.3IONS-SCNC: 7.3 MMOL/L (ref 8–16)
BASOPHILS # BLD AUTO: 0 K/UL (ref 0–0.22)
BASOPHILS NFR BLD AUTO: 0.4 % (ref 0–2)
BUN SERPL-MCNC: 27 MG/DL (ref 7–18)
CHLORIDE SERPL-SCNC: 105 MMOL/L (ref 98–107)
CO2 SERPL-SCNC: 26.3 MMOL/L (ref 21–32)
CREAT SERPL-MCNC: 0.8 MG/DL (ref 0.6–1.3)
EOSINOPHIL # BLD AUTO: 0.1 K/UL (ref 0–0.4)
EOSINOPHIL NFR BLD AUTO: 0.9 % (ref 0–4)
ERYTHROCYTE [DISTWIDTH] IN BLOOD BY AUTOMATED COUNT: 14 % (ref 11.6–13.7)
GFR SERPL CREATININE-BSD FRML MDRD: 127 ML/MIN (ref 90–?)
GLUCOSE SERPL-MCNC: 165 MG/DL (ref 74–106)
HCT VFR BLD AUTO: 28.8 % (ref 36–52)
HGB BLD-MCNC: 9.8 G/DL (ref 12–18)
LYMPHOCYTES # BLD AUTO: 1.5 K/UL (ref 2–11.5)
LYMPHOCYTES NFR BLD AUTO: 13.2 % (ref 20.5–51.1)
MCH RBC QN AUTO: 29 PG (ref 27–31)
MCHC RBC AUTO-ENTMCNC: 34 G/DL (ref 33–37)
MCV RBC AUTO: 85.4 FL (ref 80–94)
MONOCYTES # BLD AUTO: 0.7 K/UL (ref 0.8–1)
MONOCYTES NFR BLD AUTO: 6.4 % (ref 1.7–9.3)
NEUTROPHILS # BLD AUTO: 9.1 K/UL (ref 1.8–7.7)
NEUTROPHILS NFR BLD AUTO: 79.1 % (ref 42.2–75.2)
PLATELET # BLD AUTO: 164 K/UL (ref 140–450)
POTASSIUM SERPL-SCNC: 4.6 MMOL/L (ref 3.5–5.1)
RBC # BLD AUTO: 3.37 MIL/UL (ref 4.2–6.1)
SODIUM SERPL-SCNC: 134 MMOL/L (ref 136–145)
WBC # BLD AUTO: 11.5 K/UL (ref 4.8–10.8)

## 2023-04-18 RX ADMIN — DEXTROSE SCH MLS/HR: 5 SOLUTION INTRAVENOUS at 00:12

## 2023-04-18 RX ADMIN — INSULIN LISPRO PRN UNITS: 100 INJECTION, SOLUTION INTRAVENOUS; SUBCUTANEOUS at 21:19

## 2023-04-18 RX ADMIN — Medication SCH DEV: at 20:48

## 2023-04-18 RX ADMIN — DOCUSATE SODIUM SCH MG: 100 CAPSULE, LIQUID FILLED ORAL at 09:40

## 2023-04-18 RX ADMIN — GLIMEPIRIDE SCH MG: 2 TABLET ORAL at 09:40

## 2023-04-18 RX ADMIN — Medication SCH DEV: at 06:43

## 2023-04-18 RX ADMIN — SODIUM CHLORIDE PRN MG: 9 INJECTION, SOLUTION INTRAVENOUS at 00:03

## 2023-04-18 RX ADMIN — SODIUM CHLORIDE PRN MG: 9 INJECTION, SOLUTION INTRAVENOUS at 21:12

## 2023-04-18 RX ADMIN — DEXTROSE SCH MLS/HR: 5 SOLUTION INTRAVENOUS at 13:48

## 2023-04-18 RX ADMIN — GLIMEPIRIDE SCH MG: 2 TABLET ORAL at 17:00

## 2023-04-18 RX ADMIN — Medication SCH DEV: at 11:25

## 2023-04-18 RX ADMIN — INSULIN LISPRO PRN UNITS: 100 INJECTION, SOLUTION INTRAVENOUS; SUBCUTANEOUS at 17:00

## 2023-04-18 RX ADMIN — GABAPENTIN SCH MG: 300 CAPSULE ORAL at 13:16

## 2023-04-18 RX ADMIN — INSULIN GLARGINE SCH UNITS: 100 INJECTION, SOLUTION SUBCUTANEOUS at 21:13

## 2023-04-18 RX ADMIN — GABAPENTIN SCH MG: 300 CAPSULE ORAL at 09:40

## 2023-04-18 RX ADMIN — INSULIN LISPRO PRN UNITS: 100 INJECTION, SOLUTION INTRAVENOUS; SUBCUTANEOUS at 11:25

## 2023-04-18 RX ADMIN — GLIMEPIRIDE SCH MG: 2 TABLET ORAL at 13:17

## 2023-04-18 RX ADMIN — SODIUM CHLORIDE PRN MG: 9 INJECTION, SOLUTION INTRAVENOUS at 10:46

## 2023-04-18 RX ADMIN — Medication SCH DEV: at 16:59

## 2023-04-18 RX ADMIN — GABAPENTIN SCH MG: 300 CAPSULE ORAL at 17:00

## 2023-04-18 NOTE — NUR
RECEIVED REPORT FROM NIGHT NURSE ADONIS FOR CONTINUITY OF CARE. INITIAL ASSESSMENT DONE. PICC 
LINE TO TAY INTACT. NOT IN ANY DISTRESS NOTED. CALL LIGHT KEPT WITHIN REACH. WILL CONTINUE 
TO MONITOR.

## 2023-04-18 NOTE — NUR
NOTIFIED DR. MOHSIN FOR RENEWAL OF MORPHINE AND NORCO ORDER. AWAITING FOR RESPONSE. SIERRA BRADFORD 
MADE AWARE.

## 2023-04-18 NOTE — NUR
RECEIVED REPORT FROM DAY SHIFT NURSE PASTORA FOR CONTINUITY OF CARE. PATIENT IS A&O X4. 
PATIENT IS ON RA; BREATHING IS NORMAL WITH SYMMETRICAL RISE AND FALL OF CHEST. IV IS A PICC 
LINE, NO FLUIDS RUNNING AT THIS TIME (SALINE LOCKED). PATIENT IS SITTING UP IN SEMI-FOWLERS 
POSITION VISITING WITH FAMILY. BED IS IN LOWEST POSITION, WHEELS LOCKED, CALL LIGHT IN 
PLACE. WILL CONTINUE TO OBSERVE PATIENT.

## 2023-04-19 VITALS — SYSTOLIC BLOOD PRESSURE: 118 MMHG | DIASTOLIC BLOOD PRESSURE: 70 MMHG

## 2023-04-19 VITALS — DIASTOLIC BLOOD PRESSURE: 74 MMHG | SYSTOLIC BLOOD PRESSURE: 119 MMHG

## 2023-04-19 VITALS — DIASTOLIC BLOOD PRESSURE: 75 MMHG | SYSTOLIC BLOOD PRESSURE: 119 MMHG

## 2023-04-19 LAB
ANION GAP SERPL CALCULATED.3IONS-SCNC: 8.1 MMOL/L (ref 8–16)
BASOPHILS # BLD AUTO: 0 K/UL (ref 0–0.22)
BASOPHILS NFR BLD AUTO: 0.5 % (ref 0–2)
BUN SERPL-MCNC: 18 MG/DL (ref 7–18)
CHLORIDE SERPL-SCNC: 105 MMOL/L (ref 98–107)
CO2 SERPL-SCNC: 28 MMOL/L (ref 21–32)
CREAT SERPL-MCNC: 0.9 MG/DL (ref 0.6–1.3)
EOSINOPHIL # BLD AUTO: 0.1 K/UL (ref 0–0.4)
EOSINOPHIL NFR BLD AUTO: 1.7 % (ref 0–4)
ERYTHROCYTE [DISTWIDTH] IN BLOOD BY AUTOMATED COUNT: 14.2 % (ref 11.6–13.7)
GFR SERPL CREATININE-BSD FRML MDRD: 111 ML/MIN (ref 90–?)
GLUCOSE SERPL-MCNC: 218 MG/DL (ref 74–106)
HCT VFR BLD AUTO: 32.1 % (ref 36–52)
HGB BLD-MCNC: 10.7 G/DL (ref 12–18)
LYMPHOCYTES # BLD AUTO: 0.9 K/UL (ref 2–11.5)
LYMPHOCYTES NFR BLD AUTO: 12.9 % (ref 20.5–51.1)
MCH RBC QN AUTO: 29 PG (ref 27–31)
MCHC RBC AUTO-ENTMCNC: 33 G/DL (ref 33–37)
MCV RBC AUTO: 87.4 FL (ref 80–94)
MONOCYTES # BLD AUTO: 0.5 K/UL (ref 0.8–1)
MONOCYTES NFR BLD AUTO: 6.3 % (ref 1.7–9.3)
NEUTROPHILS # BLD AUTO: 5.6 K/UL (ref 1.8–7.7)
NEUTROPHILS NFR BLD AUTO: 78.6 % (ref 42.2–75.2)
PLATELET # BLD AUTO: 145 K/UL (ref 140–450)
POTASSIUM SERPL-SCNC: 5.1 MMOL/L (ref 3.5–5.1)
RBC # BLD AUTO: 3.67 MIL/UL (ref 4.2–6.1)
SODIUM SERPL-SCNC: 136 MMOL/L (ref 136–145)
WBC # BLD AUTO: 7.1 K/UL (ref 4.8–10.8)

## 2023-04-19 RX ADMIN — GLIMEPIRIDE SCH MG: 2 TABLET ORAL at 10:29

## 2023-04-19 RX ADMIN — Medication SCH DEV: at 07:02

## 2023-04-19 RX ADMIN — SODIUM CHLORIDE PRN MG: 9 INJECTION, SOLUTION INTRAVENOUS at 10:39

## 2023-04-19 RX ADMIN — DEXTROSE SCH MLS/HR: 5 SOLUTION INTRAVENOUS at 01:22

## 2023-04-19 RX ADMIN — GABAPENTIN SCH MG: 300 CAPSULE ORAL at 17:19

## 2023-04-19 RX ADMIN — Medication SCH DEV: at 11:31

## 2023-04-19 RX ADMIN — SODIUM CHLORIDE PRN MG: 9 INJECTION, SOLUTION INTRAVENOUS at 05:28

## 2023-04-19 RX ADMIN — GABAPENTIN SCH MG: 300 CAPSULE ORAL at 10:29

## 2023-04-19 RX ADMIN — DOCUSATE SODIUM SCH MG: 100 CAPSULE, LIQUID FILLED ORAL at 10:28

## 2023-04-19 RX ADMIN — DEXTROSE SCH MLS/HR: 5 SOLUTION INTRAVENOUS at 12:47

## 2023-04-19 RX ADMIN — INSULIN LISPRO PRN UNITS: 100 INJECTION, SOLUTION INTRAVENOUS; SUBCUTANEOUS at 07:02

## 2023-04-19 RX ADMIN — Medication SCH DEV: at 17:19

## 2023-04-19 RX ADMIN — INSULIN LISPRO PRN UNITS: 100 INJECTION, SOLUTION INTRAVENOUS; SUBCUTANEOUS at 11:32

## 2023-04-19 RX ADMIN — INSULIN GLARGINE SCH UNITS: 100 INJECTION, SOLUTION SUBCUTANEOUS at 21:00

## 2023-04-19 RX ADMIN — SODIUM CHLORIDE PRN MG: 9 INJECTION, SOLUTION INTRAVENOUS at 23:46

## 2023-04-19 RX ADMIN — Medication SCH DEV: at 21:00

## 2023-04-19 RX ADMIN — GLIMEPIRIDE SCH MG: 2 TABLET ORAL at 12:48

## 2023-04-19 RX ADMIN — GABAPENTIN SCH MG: 300 CAPSULE ORAL at 12:48

## 2023-04-19 RX ADMIN — GLIMEPIRIDE SCH MG: 2 TABLET ORAL at 17:19

## 2023-04-19 RX ADMIN — INSULIN LISPRO PRN UNITS: 100 INJECTION, SOLUTION INTRAVENOUS; SUBCUTANEOUS at 23:43

## 2023-04-19 RX ADMIN — INSULIN LISPRO PRN UNITS: 100 INJECTION, SOLUTION INTRAVENOUS; SUBCUTANEOUS at 17:23

## 2023-04-19 RX ADMIN — SODIUM CHLORIDE PRN MG: 9 INJECTION, SOLUTION INTRAVENOUS at 17:24

## 2023-04-19 NOTE — NUR
DR. MOHSIN WAS MESSAGED ABOUT MORPHINE AND NORCO EXPIRING AT 0545 IN THE MORNING. DR. MOHSIN 
AUTHORIZED RENEW OF BOTH MORPHINE AND NORCO. CALLED PHARMACY AT 0520 TO LET THEM KNOW 
MEDICATION RENEWAL WAS OBTAINED FROM MD. PHARMACIST SIOBHAN SAID TO PUT IT IN AS A NEW ORDER 
AND THE OTHER ONES WILL D/C AT SCHEDULED TIME. PUT NEW ORDERS IN.

## 2023-04-19 NOTE — NUR
PATIENT CALLED AND REQUESTED PAIN MEDICATION. ASSESSED VITALS AND CHART; MORPHINE WAS 
APPROPRIATE TO ADMINISTER. MEDICATION ADMINISTERED SUCCESSFULLY WITHOUT ANY ISSUES. PATIENT 
IS CURRENTLY SLEEPING, LYING SUPINE. 0100 MEDICATION ADMINISTERED WITHOUT ANY ISSUES WITH 
PICCLINE. PATIENT'S BREATHING IS NORMAL WITH SYMMETRICAL RISE AND FALL OF CHEST. WILL 
CONTINUE TO OBSERVE PATIENT.

## 2023-04-19 NOTE — NUR
1950 PM NURSING NARRATIVE (OPENING)

HAND-OFF REPORT RECEIVED FROM DAKSHA BRADFORD A.M NURSE FOR CONTINUITY OF CARE WITH BEDSIDE ROUNDS 
FOLLOWING. REPORTED: PT STABLE CONDITION, WITH ENCOURAGEMENT TO USE URINAL AND BSC INSTEAD 
OF ATTEMPTING BRP DUE TO UNSTEADINESS WITH ASSIST. PT COMPLIANT IN ASKING FOR ASSISTANCE FOR 
BRP. NS@135ML/HR VIA TAY PICC. CONTACT ISO. MRSA URINE/BLOOD.

PT RECEIVED AS REPORTED, RESTING IN BED. DENIES CHEST PAIN. NO RESP DISTRESS. PLAN OF CARE 
FOR SHIFT: MEDS AS ORDERS, COMFORT MEASURES.

## 2023-04-19 NOTE — NUR
PATIENT AMBULATED TO THE BATHROOM WITH THE ASSISTANCE OF WILL OTTO AND MYSELF. PATIENT HAD A 
VERY WEAK GAIT, NEEDING ASSISTANCE ON BOTH SIDES OF HIS BODY TO WALK. AFTER GETTING BACK IN 
BED, PATIENT REQUESTED A PAIN MEDICATION STATING THAT THE WALK WAS PAINFUL FOR HIM. CHECKED 
PATIENT'S CHART AND VITALS; MORPHINE WAS APPROPRIATE TO ADMINISTER. ADMINISTERED MORPHINE, 
PATIENT TOLERATED WELL. WILL CONTINUE TO OBSERVE PATIENT.

## 2023-04-19 NOTE — NUR
ENDORSE PATIENT IN STABLE CONDITION TO PM SHIFT NURSE WHILE NS INFUSING @135ML/HR VIA TAY 
PICC SITE.

## 2023-04-20 VITALS — SYSTOLIC BLOOD PRESSURE: 103 MMHG | DIASTOLIC BLOOD PRESSURE: 68 MMHG

## 2023-04-20 VITALS — SYSTOLIC BLOOD PRESSURE: 124 MMHG | DIASTOLIC BLOOD PRESSURE: 79 MMHG

## 2023-04-20 LAB
ANION GAP SERPL CALCULATED.3IONS-SCNC: 8.7 MMOL/L (ref 8–16)
BASOPHILS # BLD AUTO: 0 K/UL (ref 0–0.22)
BASOPHILS NFR BLD AUTO: 0.4 % (ref 0–2)
BUN SERPL-MCNC: 18 MG/DL (ref 7–18)
CHLORIDE SERPL-SCNC: 106 MMOL/L (ref 98–107)
CO2 SERPL-SCNC: 25.1 MMOL/L (ref 21–32)
CREAT SERPL-MCNC: 0.8 MG/DL (ref 0.6–1.3)
EOSINOPHIL # BLD AUTO: 0.1 K/UL (ref 0–0.4)
EOSINOPHIL NFR BLD AUTO: 1 % (ref 0–4)
ERYTHROCYTE [DISTWIDTH] IN BLOOD BY AUTOMATED COUNT: 14.6 % (ref 11.6–13.7)
GFR SERPL CREATININE-BSD FRML MDRD: 127 ML/MIN (ref 90–?)
GLUCOSE SERPL-MCNC: 252 MG/DL (ref 74–106)
HCT VFR BLD AUTO: 30.8 % (ref 36–52)
HGB BLD-MCNC: 10.2 G/DL (ref 12–18)
LYMPHOCYTES # BLD AUTO: 1.1 K/UL (ref 2–11.5)
LYMPHOCYTES NFR BLD AUTO: 12.9 % (ref 20.5–51.1)
MCH RBC QN AUTO: 29 PG (ref 27–31)
MCHC RBC AUTO-ENTMCNC: 33 G/DL (ref 33–37)
MCV RBC AUTO: 86.8 FL (ref 80–94)
MONOCYTES # BLD AUTO: 0.6 K/UL (ref 0.8–1)
MONOCYTES NFR BLD AUTO: 6.9 % (ref 1.7–9.3)
NEUTROPHILS # BLD AUTO: 6.5 K/UL (ref 1.8–7.7)
NEUTROPHILS NFR BLD AUTO: 78.8 % (ref 42.2–75.2)
PLATELET # BLD AUTO: 155 K/UL (ref 140–450)
POTASSIUM SERPL-SCNC: 4.8 MMOL/L (ref 3.5–5.1)
RBC # BLD AUTO: 3.55 MIL/UL (ref 4.2–6.1)
SODIUM SERPL-SCNC: 135 MMOL/L (ref 136–145)
WBC # BLD AUTO: 8.3 K/UL (ref 4.8–10.8)

## 2023-04-20 RX ADMIN — SODIUM CHLORIDE PRN MG: 9 INJECTION, SOLUTION INTRAVENOUS at 05:10

## 2023-04-20 RX ADMIN — Medication SCH DEV: at 12:30

## 2023-04-20 RX ADMIN — SODIUM CHLORIDE PRN MG: 9 INJECTION, SOLUTION INTRAVENOUS at 14:51

## 2023-04-20 RX ADMIN — GABAPENTIN SCH MG: 300 CAPSULE ORAL at 09:00

## 2023-04-20 RX ADMIN — GABAPENTIN SCH MG: 300 CAPSULE ORAL at 13:45

## 2023-04-20 RX ADMIN — GLIMEPIRIDE SCH MG: 2 TABLET ORAL at 13:45

## 2023-04-20 RX ADMIN — INSULIN LISPRO PRN UNITS: 100 INJECTION, SOLUTION INTRAVENOUS; SUBCUTANEOUS at 13:47

## 2023-04-20 RX ADMIN — DOCUSATE SODIUM SCH MG: 100 CAPSULE, LIQUID FILLED ORAL at 09:00

## 2023-04-20 RX ADMIN — GLIMEPIRIDE SCH MG: 2 TABLET ORAL at 15:01

## 2023-04-20 RX ADMIN — SODIUM CHLORIDE PRN MG: 9 INJECTION, SOLUTION INTRAVENOUS at 11:05

## 2023-04-20 RX ADMIN — INSULIN LISPRO PRN UNITS: 100 INJECTION, SOLUTION INTRAVENOUS; SUBCUTANEOUS at 04:58

## 2023-04-20 RX ADMIN — Medication SCH DEV: at 04:57

## 2023-04-20 RX ADMIN — GLIMEPIRIDE SCH MG: 2 TABLET ORAL at 09:00

## 2023-04-20 NOTE — NUR
Patient c/o abdominal pain level of8/10 on the scale of 1-10

Morphine sulfate 4 give ivp, will reasses

## 2023-04-20 NOTE — NUR
Received in no acute distress BP Vss, all safety measures in place

Abdomen appears distended, bowel sounds present, voids using the urinal

stated he wants to go home today

## 2023-04-20 NOTE — NUR
RECEIVED ORDER FOR HOME HEALTH FOR IV ABX. FAXED ALL PAPER WORK TO [facility] 
[phone] AND [facility] [phone] WHO SAID THEY WOULD BE ABLE TO ACCEPT PATIENT. 
THEY WILL BE CONTACTING PATIENT AND PATIENTS FAMILY TO START HOME HEALTH.

-------------------------------------------------------------------------------

Addendum: 04/20/23 at 1331 by JOSEY HADLEY 

-------------------------------------------------------------------------------

[facility] PHONE # [phone]

## 2023-04-20 NOTE — NUR
Patient given discharge instructions, expressed understanding. Patient will receive Home 
Health visits at home and has contacted the patient for the visit schedule. PICC Line capped 
and gave instructions to keep it capped and clean, flushed with saline, dressing dry and 
intact, wife is coming to take patient home, expressed understanding of all instructions 
given

## 2023-04-20 NOTE — NUR
***** PHYSICAL THERAPY CO-SIGN *****

The Physical Therapy Progress Notes documented by Physical Therapy Assistant have been 
reviewed.



Reviewed/Co-Signed by: Shanthi Abreu PT

Documentation Done by:FENG BRYANT PTA

-------------------------------------------------------------------------------

Addendum: 04/20/23 at 1626 by Shanthi Abreu PT

-------------------------------------------------------------------------------

Amended: Links added.

## 2023-04-20 NOTE — NUR
PT VERBALIZING  EXPERIENCES

STATED APPRECIATION FOR ACTIVELY LISTENING TO HIS  EXPERIENCES AND APPEARING 
INTERESTED GENUINELY. PT TEARING A BIT. STATED RELIEF "I HAVE NOT TALKED TO ANYONE IN A LONG 
TIME ABOUT MY  EXPERIENCES"/ CONTINUE TO PROVIDE ATMOSPHERE FOR PT TO FEEL HEARD.

## 2023-04-20 NOTE — NUR
c/o abdominal pain 9/10 in pain scale of 10, sharp in character Morphine sulfate 4mg given 
ivp. will reasses

## 2023-04-20 NOTE — NUR
0730 PM NURSING NARRATIVE (CLOSING)

HAND-OFF REPORT TO ON-COMING RN FOR CONTINUITY OF CARE FOLLOWED BY BEDSIDE ROUNDS. VSS. 
UNEVENTFUL NOC. ENDORSED: ENCOURAGED TO USE URINAL/BSC  AS OPPOSED TO FREQUENT TRIPS TO 
BATHROOM BECAUSE OF UNSTEADY GAIT AND TO DECREASE CHANCES OF INJURY. RELINQUISHED CARE OF 
PT.